# Patient Record
(demographics unavailable — no encounter records)

---

## 2019-01-15 NOTE — ULT
RIGHT BREAST ULTRASOUND LIMITED:

1/15/19

 

HISTORY: 

18-year-old presents with a palpable finding in the right breast and pain over the breast. 

 

Attenuation is paid to the 7 o'clock position but additional areas of imaging around the breast inclu
ding 12, 3, 6, 7, and 8 o'clock regions are all evaluated.  There is no solid or cystic mass. There i
s some asymmetric echogenic glandular tissue. 

 

IMPRESSION:  

BIRADS 2: Benign Finding(s) 

  Routine annual screening mammography (for women over age 40).

 

Minimal asymmetric echogenic glandular tissue. 

 

If the patient develops any new palpable findings, followup ultrasound examination at that point in t
brissa might be of benefit. 

 

POS: OFF